# Patient Record
Sex: FEMALE | Race: WHITE | Employment: UNEMPLOYED | URBAN - METROPOLITAN AREA
[De-identification: names, ages, dates, MRNs, and addresses within clinical notes are randomized per-mention and may not be internally consistent; named-entity substitution may affect disease eponyms.]

---
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
## 2025-01-28 ENCOUNTER — OFFICE VISIT (OUTPATIENT)
Dept: URGENT CARE | Facility: URGENT CARE | Age: 20
End: 2025-01-28

## 2025-01-28 ENCOUNTER — HOSPITAL ENCOUNTER (EMERGENCY)
Facility: CLINIC | Age: 20
Discharge: HOME | End: 2025-01-28
Attending: EMERGENCY MEDICINE | Admitting: EMERGENCY MEDICINE

## 2025-01-28 VITALS
OXYGEN SATURATION: 99 % | HEART RATE: 60 BPM | WEIGHT: 198.6 LBS | RESPIRATION RATE: 18 BRPM | SYSTOLIC BLOOD PRESSURE: 132 MMHG | TEMPERATURE: 97.6 F | DIASTOLIC BLOOD PRESSURE: 83 MMHG

## 2025-01-28 VITALS
SYSTOLIC BLOOD PRESSURE: 129 MMHG | HEIGHT: 61 IN | HEART RATE: 54 BPM | TEMPERATURE: 98.3 F | WEIGHT: 199 LBS | OXYGEN SATURATION: 100 % | RESPIRATION RATE: 18 BRPM | DIASTOLIC BLOOD PRESSURE: 69 MMHG | BODY MASS INDEX: 37.57 KG/M2

## 2025-01-28 DIAGNOSIS — M54.2 NECK PAIN ON RIGHT SIDE: ICD-10-CM

## 2025-01-28 DIAGNOSIS — R20.0 RIGHT ARM NUMBNESS: Primary | ICD-10-CM

## 2025-01-28 DIAGNOSIS — G51.0 BELL'S PALSY: ICD-10-CM

## 2025-01-28 DIAGNOSIS — R29.810 FACIAL DROOP: ICD-10-CM

## 2025-01-28 LAB
ANION GAP SERPL CALCULATED.3IONS-SCNC: 11 MMOL/L (ref 7–15)
BASOPHILS # BLD AUTO: 0.1 10E3/UL (ref 0–0.2)
BASOPHILS NFR BLD AUTO: 1 %
BUN SERPL-MCNC: 15.5 MG/DL (ref 6–20)
CALCIUM SERPL-MCNC: 9.1 MG/DL (ref 8.8–10.4)
CHLORIDE SERPL-SCNC: 106 MMOL/L (ref 98–107)
CREAT SERPL-MCNC: 0.76 MG/DL (ref 0.51–0.95)
EGFRCR SERPLBLD CKD-EPI 2021: >90 ML/MIN/1.73M2
EOSINOPHIL # BLD AUTO: 0.2 10E3/UL (ref 0–0.7)
EOSINOPHIL NFR BLD AUTO: 2 %
ERYTHROCYTE [DISTWIDTH] IN BLOOD BY AUTOMATED COUNT: 14 % (ref 10–15)
GLUCOSE SERPL-MCNC: 113 MG/DL (ref 70–99)
HCO3 SERPL-SCNC: 25 MMOL/L (ref 22–29)
HCT VFR BLD AUTO: 34.8 % (ref 35–47)
HGB BLD-MCNC: 11 G/DL (ref 11.7–15.7)
HOLD SPECIMEN: NORMAL
HOLD SPECIMEN: NORMAL
IMM GRANULOCYTES # BLD: 0 10E3/UL
IMM GRANULOCYTES NFR BLD: 0 %
LYMPHOCYTES # BLD AUTO: 2.9 10E3/UL (ref 0.8–5.3)
LYMPHOCYTES NFR BLD AUTO: 29 %
MCH RBC QN AUTO: 27.6 PG (ref 26.5–33)
MCHC RBC AUTO-ENTMCNC: 31.6 G/DL (ref 31.5–36.5)
MCV RBC AUTO: 87 FL (ref 78–100)
MONOCYTES # BLD AUTO: 0.8 10E3/UL (ref 0–1.3)
MONOCYTES NFR BLD AUTO: 8 %
NEUTROPHILS # BLD AUTO: 6.1 10E3/UL (ref 1.6–8.3)
NEUTROPHILS NFR BLD AUTO: 61 %
NRBC # BLD AUTO: 0 10E3/UL
NRBC BLD AUTO-RTO: 0 /100
PLATELET # BLD AUTO: 307 10E3/UL (ref 150–450)
POTASSIUM SERPL-SCNC: 3.9 MMOL/L (ref 3.4–5.3)
RBC # BLD AUTO: 3.98 10E6/UL (ref 3.8–5.2)
SODIUM SERPL-SCNC: 142 MMOL/L (ref 135–145)
WBC # BLD AUTO: 10 10E3/UL (ref 4–11)

## 2025-01-28 PROCEDURE — 36415 COLL VENOUS BLD VENIPUNCTURE: CPT | Performed by: EMERGENCY MEDICINE

## 2025-01-28 PROCEDURE — 85004 AUTOMATED DIFF WBC COUNT: CPT | Performed by: EMERGENCY MEDICINE

## 2025-01-28 PROCEDURE — 85048 AUTOMATED LEUKOCYTE COUNT: CPT | Performed by: EMERGENCY MEDICINE

## 2025-01-28 PROCEDURE — 99204 OFFICE O/P NEW MOD 45 MIN: CPT | Performed by: EMERGENCY MEDICINE

## 2025-01-28 PROCEDURE — 99284 EMERGENCY DEPT VISIT MOD MDM: CPT

## 2025-01-28 PROCEDURE — 80048 BASIC METABOLIC PNL TOTAL CA: CPT | Performed by: EMERGENCY MEDICINE

## 2025-01-28 RX ORDER — VALACYCLOVIR HYDROCHLORIDE 1 G/1
1000 TABLET, FILM COATED ORAL 3 TIMES DAILY
Qty: 21 TABLET | Refills: 0 | Status: SHIPPED | OUTPATIENT
Start: 2025-01-28 | End: 2025-01-28

## 2025-01-28 RX ORDER — PREDNISONE 20 MG/1
60 TABLET ORAL DAILY
Qty: 21 TABLET | Refills: 0 | Status: SHIPPED | OUTPATIENT
Start: 2025-01-28 | End: 2025-01-28

## 2025-01-28 RX ORDER — VALACYCLOVIR HYDROCHLORIDE 1 G/1
1000 TABLET, FILM COATED ORAL 3 TIMES DAILY
Qty: 21 TABLET | Refills: 0 | Status: SHIPPED | OUTPATIENT
Start: 2025-01-28

## 2025-01-28 RX ORDER — PREDNISONE 20 MG/1
60 TABLET ORAL DAILY
Qty: 21 TABLET | Refills: 0 | Status: SHIPPED | OUTPATIENT
Start: 2025-01-28

## 2025-01-28 ASSESSMENT — ACTIVITIES OF DAILY LIVING (ADL)
ADLS_ACUITY_SCORE: 41

## 2025-01-28 NOTE — DISCHARGE INSTRUCTIONS
Take both medications as directed until gone.  Make sure your eye is closing when you are sleeping at night.  If it is not closing completely,  some eye lubricant ointment and put some in your lower eyelid at night before you go to sleep and you can use eyedrops during the day if your eye feels dry.  Schedule a follow-up appoint with your doctor in a week for a recheck.

## 2025-01-28 NOTE — ED PROVIDER NOTES
"  Emergency Department Note      History of Present Illness     Chief Complaint   Numbness      HPI   Gisella Manrique is a 19 year old female who has developed some burning pain in the right side of her face and cheek earlier today.  She noticed some weakness in her facial muscles as well.  Incidentally she noticed that today she had a little tingling around her right elbow and mid arm but not down her hand or up her arm.  No weakness in her arms or legs.  No speech difficulty no vision change.  No headache.  She has not had any cold sores recently.    Independent Historian   None    Review of External Notes   I reviewed the urgent care note from earlier today for facial and right upper extremity numbness.     Past Medical History     Medical History and Problem List   Acanthosis nigricans     Medications   Clindamycin   Oxycodone     Physical Exam     Patient Vitals for the past 24 hrs:   BP Temp Temp src Pulse Resp SpO2 Height Weight   01/28/25 1927 129/69 -- -- 54 18 100 % -- --   01/28/25 1636 98/61 98.3  F (36.8  C) Oral 66 18 100 % 1.549 m (5' 1\") 90.3 kg (199 lb)     Physical Exam  Nursing note and vitals reviewed.  Constitutional:  Alert.  Appears comfortable.   HENT:    Mucous membranes are moist.  Patient does have obvious weakness in the right forehead as the eyebrow is down compared to the left 1 and she cannot raise it fully up.  She also has a slight right facial droop.  No rash or evidence of shingles.  Eyes:    Conjunctivae are normal.      Right eye exhibits no discharge. Left eye exhibits no discharge.   Cardiovascular:  Normal rate, regular rhythm.      Normal heart sounds.  Pulmonary/Chest:  Breath sounds clear. No respiratory distress.     No stridor.   Neuro:   Patient has mild right facial droop, weakness to the right forehead with lowering of the eyebrow.  No weakness in her arms or legs.  Eyes are normal.  Sensation is essentially normal to the right face although she says it is tingly " or slightly decreased.  Neurological:   Alert and appropriate. No focal weakness.  Skin:    Skin is warm and dry. No rash noted. No diaphoresis.     Diagnostics     Lab Results   Labs Ordered and Resulted from Time of ED Arrival to Time of ED Departure   BASIC METABOLIC PANEL - Abnormal       Result Value    Sodium 142      Potassium 3.9      Chloride 106      Carbon Dioxide (CO2) 25      Anion Gap 11      Urea Nitrogen 15.5      Creatinine 0.76      GFR Estimate >90      Calcium 9.1      Glucose 113 (*)    CBC WITH PLATELETS AND DIFFERENTIAL - Abnormal    WBC Count 10.0      RBC Count 3.98      Hemoglobin 11.0 (*)     Hematocrit 34.8 (*)     MCV 87      MCH 27.6      MCHC 31.6      RDW 14.0      Platelet Count 307      % Neutrophils 61      % Lymphocytes 29      % Monocytes 8      % Eosinophils 2      % Basophils 1      % Immature Granulocytes 0      NRBCs per 100 WBC 0      Absolute Neutrophils 6.1      Absolute Lymphocytes 2.9      Absolute Monocytes 0.8      Absolute Eosinophils 0.2      Absolute Basophils 0.1      Absolute Immature Granulocytes 0.0      Absolute NRBCs 0.0         Imaging   No orders to display     Independent Interpretation   None    ED Course      Medications Administered   Medications - No data to display    Procedures   Procedures     Discussion of Management   None    ED Course   ED Course as of 01/28/25 2050 Tue Jan 28, 2025 1650 I obtained history and performed a physical exam as noted above.    1852 I rechecked and updated the patient.    1920 I rechecked and updated the patient.        Additional Documentation  None    Medical Decision Making / Diagnosis     CMS Diagnoses: None    MIPS       None    [facility]   Gisella Manrique is a 19 year old female who comes in with right facial pain and a little bit of numbness and weakness.  She has Bell's palsy.  Her I did not close fully so I talked her about eyedrops and eye ointment and will prescribe prednisone and Valtrex.  She will  follow-up in a week with her doctor.    Take both medications as directed until gone.  Make sure your eye is closing when you are sleeping at night.  If it is not closing completely,  some eye lubricant ointment and put some in your lower eyelid at night before you go to sleep and you can use eyedrops during the day if your eye feels dry.  Schedule a follow-up appoint with your doctor in a week for a recheck.    Disposition   The patient was discharged.     Diagnosis     ICD-10-CM    1. Bell's palsy  G51.0            Discharge Medications   Discharge Medication List as of 1/28/2025  7:21 PM        START taking these medications    Details   predniSONE (DELTASONE) 20 MG tablet Take 3 tablets (60 mg) by mouth daily for 7 days., Disp-21 tablet, R-0, Local Print      valACYclovir (VALTREX) 1000 mg tablet Take 1 tablet (1,000 mg) by mouth 3 times daily for 7 days., Disp-21 tablet, R-0, Local Print               Scribe Disclosure:  I, Ludy Webb, am serving as a scribe at 5:05 PM on 1/28/2025 to document services personally performed by Mitzi Wu MD based on my observations and the provider's statements to me.        Mitzi Wu MD  01/28/25 2050

## 2025-01-28 NOTE — PROGRESS NOTES
Assessment & Plan     Diagnosis:    ICD-10-CM    1. Right arm numbness  R20.0       2. Facial droop  R29.810       3. Neck pain on right side  M54.2           Medical Decision Making:  Gisella Manrique is a 19 year old female who presents for evaluation of right neck sided neck/throat pain, subtle facial droop on the right. While the most likely etiology considered was Bell's Palsy, nonetheless a broad differential was considered including CVA (especially brainstem CVA), Lyme disease manifestation, neuropathy associated with HTN, HIV, DM, Banks-Hunt syndrome, lymphoma, sarcoidosis, brain tumor, etc.  Given the patient's exam including detailed neurologic exam, history and symptoms, age and risk factors, I believe this most likely represents Stendal Palsy.    However, patient also reports in the last 10 monutes she is now experiencing right arm numbness which appears to be present on exam. Given addition of extremity symptoms; patient is directed to go to the ER now for further evaluation now. Patient verbalizes understanding and agreement with the plan. Is going to have her significant other take her across the street to [facility].    Julio Hernandez PA-C  [facility] URGENT CARE    Subjective     HPI    Gisella Manrique is a 19 year old female who presents to clinic today for the following health issues:  Chief Complaint   Patient presents with    Urgent Care    Facial Pain     Pt reports pulsating pain right lymph node in the neck and also complains of right eye pulsating and glitching onset yesterday  Left eye swollen and right-sided facial numbness, pain and HA on right temporal onset today. Pt states she is unable to move or smile without pain, drooling when drinking water onset today around 7 AM   Numbness is radiating down to right arm onset 10 minutes ago   Intermittent chest pain this morning   Took Ibuprofen 800 mg around 1:00 PM      Patient states that around 7 AM started experiencing  right-sided neck pain, feels like a swollen lymph node, feels her right eye is pulsing and itchy.  She feels like now the right side of her face is starting to droop slightly, she is drooling when drinking water.  The right side of her face has also gotten progressively more numb throughout the day.  She when she has a lot of pain when trying to smile or move her face now.  In the last 10 minutes she has also had some right arm numbness.  She has had a little bit of chest pain intermittently this morning, no difficulty swallowing or breathing.  No rash, cough, nausea, vomiting, diarrhea. No recent travel or insect bites.    Review of Systems    See HPI    Objective      Vitals: /83 (BP Location: Left arm, Patient Position: Sitting, Cuff Size: Adult Regular)   Pulse 60   Temp 97.6  F (36.4  C) (Tympanic)   Resp 18   Wt 90.1 kg (198 lb 9.6 oz)   LMP 01/19/2025 (Exact Date)   SpO2 99%   Breastfeeding No       Patient Vitals for the past 24 hrs:   BP Temp Temp src Pulse Resp SpO2 Weight   01/28/25 1551 132/83 97.6  F (36.4  C) Tympanic 60 18 99 % 90.1 kg (198 lb 9.6 oz)       Vital signs reviewed by: Julio Hernandez PA-C    Physical Exam   Constitutional: Patient is alert and cooperative. No acute distress.  Ears: Right TM is normal. Left TM is normal. External ear canals are normal.  Mouth: Mucous membranes are moist. Normal tongue and tonsil. Posterior oropharynx is clear.  Eyes: Conjunctivae, EOMI and lids are normal. PERRL.  Cardiovascular: Regular rate and rhythm  Pulmonary/Chest: Lungs are clear to auscultation throughout. Effort normal. No respiratory distress. No wheezes, rales or rhonchi.  Neurological: Alert and oriented x3. Subtle facial droop on the right and nasolabial flattening.   Sensation diminished in the right upper arm compared with left upper arm.  strength symmetric. Speech is fluent. Gait stable.  Skin: No rash noted on visualized skin.  Psychiatric:The patient has a normal mood  and aldair.     Julio Hernandez PA-C, January 28, 2025

## 2025-01-28 NOTE — ED TRIAGE NOTES
Pt reports numbness right side of face and right arm that started this morning at 0600.     Pt states that prior to this, yesterday she noticed pain below right ear, right jaw pain, headache, and then right cheek became swollen around 0300, and then left eye became swollen around 0800.     Pt denies allergies. Neuro assessment otherwise intact.     Pt states her mom has a hx of right side facial paralysis.